# Patient Record
Sex: FEMALE | Race: WHITE | NOT HISPANIC OR LATINO | Employment: UNEMPLOYED | URBAN - NONMETROPOLITAN AREA
[De-identification: names, ages, dates, MRNs, and addresses within clinical notes are randomized per-mention and may not be internally consistent; named-entity substitution may affect disease eponyms.]

---

## 2019-09-06 ENCOUNTER — OFFICE VISIT (OUTPATIENT)
Dept: INTERNAL MEDICINE | Facility: CLINIC | Age: 39
End: 2019-09-06

## 2019-09-06 VITALS
BODY MASS INDEX: 37.61 KG/M2 | OXYGEN SATURATION: 98 % | HEART RATE: 82 BPM | DIASTOLIC BLOOD PRESSURE: 93 MMHG | WEIGHT: 234 LBS | RESPIRATION RATE: 18 BRPM | SYSTOLIC BLOOD PRESSURE: 127 MMHG | TEMPERATURE: 98 F | HEIGHT: 66 IN

## 2019-09-06 DIAGNOSIS — J20.9 ACUTE BRONCHITIS, UNSPECIFIED ORGANISM: Primary | ICD-10-CM

## 2019-09-06 DIAGNOSIS — J01.00 ACUTE NON-RECURRENT MAXILLARY SINUSITIS: ICD-10-CM

## 2019-09-06 PROCEDURE — 99213 OFFICE O/P EST LOW 20 MIN: CPT | Performed by: NURSE PRACTITIONER

## 2019-09-06 PROCEDURE — 96372 THER/PROPH/DIAG INJ SC/IM: CPT | Performed by: NURSE PRACTITIONER

## 2019-09-06 RX ORDER — METHYLPREDNISOLONE ACETATE 40 MG/ML
40 INJECTION, SUSPENSION INTRA-ARTICULAR; INTRALESIONAL; INTRAMUSCULAR; SOFT TISSUE ONCE
Status: COMPLETED | OUTPATIENT
Start: 2019-09-06 | End: 2019-09-06

## 2019-09-06 RX ORDER — ALBUTEROL SULFATE 2.5 MG/3ML
2.5 SOLUTION RESPIRATORY (INHALATION) EVERY 4 HOURS PRN
Qty: 100 VIAL | Refills: 0 | Status: SHIPPED | OUTPATIENT
Start: 2019-09-06 | End: 2021-06-24 | Stop reason: SDUPTHER

## 2019-09-06 RX ORDER — AZITHROMYCIN 250 MG/1
TABLET, FILM COATED ORAL
Qty: 6 TABLET | Refills: 0 | Status: SHIPPED | OUTPATIENT
Start: 2019-09-06 | End: 2020-10-06

## 2019-09-06 RX ADMIN — METHYLPREDNISOLONE ACETATE 40 MG: 40 INJECTION, SUSPENSION INTRA-ARTICULAR; INTRALESIONAL; INTRAMUSCULAR; SOFT TISSUE at 12:15

## 2019-09-06 NOTE — PROGRESS NOTES
CC: cough and myalgia    History:  Dustin Mosley is a 38 y.o. female who presents today for evaluation of the above problems.    URI    This is a new problem. The current episode started in the past 7 days. The problem has been gradually worsening. There has been no fever. Associated symptoms include congestion, coughing, ear pain, headaches, rhinorrhea and wheezing. Pertinent negatives include no sneezing or sore throat. Treatments tried: Dulera inhaler, nebulizer, OTC cough syrup. The treatment provided no relief.   Cough   This is a new problem. The current episode started in the past 7 days. The problem has been gradually worsening. The problem occurs every few minutes. The cough is non-productive. Associated symptoms include ear pain, headaches, nasal congestion, rhinorrhea, shortness of breath and wheezing. Pertinent negatives include no fever or sore throat. Nothing aggravates the symptoms. Treatments tried: Duler and albuterol nebulizer. The treatment provided no relief.     ROS:  Review of Systems   Constitutional: Negative for fever.   HENT: Positive for congestion, ear pain and rhinorrhea. Negative for sneezing and sore throat.    Respiratory: Positive for cough, shortness of breath and wheezing.    Neurological: Positive for headaches.       No Known Allergies  Past Medical History:   Diagnosis Date   • Anxiety    • Heart burn    • Hypertension      Past Surgical History:   Procedure Laterality Date   •  SECTION     • HERNIA REPAIR  2018     Family History   Problem Relation Age of Onset   • Cancer Mother    • Hypertension Mother       reports that she has been smoking cigarettes.  She started smoking about 19 years ago. She has been smoking about 1.00 pack per day. She has never used smokeless tobacco. She reports that she does not drink alcohol or use drugs.      Current Outpatient Medications:   •  albuterol (PROVENTIL) (2.5 MG/3ML) 0.083% nebulizer solution, Take 2.5 mg by nebulization Every 4  "(Four) Hours As Needed for Wheezing., Disp: 100 vial, Rfl: 0  •  azithromycin (ZITHROMAX) 250 MG tablet, Take 2 tablets the first day, then 1 tablet daily for 4 days., Disp: 6 tablet, Rfl: 0    Current Facility-Administered Medications:   •  methylPREDNISolone acetate (DEPO-medrol) injection 40 mg, 40 mg, Intramuscular, Once, Fernando Joyce M, APRN    OBJECTIVE:  /93 (BP Location: Right arm, Patient Position: Sitting, Cuff Size: Adult)   Pulse 82   Temp 98 °F (36.7 °C) (Oral)   Resp 18   Ht 167.6 cm (66\")   Wt 106 kg (234 lb)   LMP 09/02/2019 (Exact Date)   SpO2 98%   Breastfeeding? No   BMI 37.77 kg/m²    Physical Exam   Constitutional: She is oriented to person, place, and time. Vital signs are normal. She appears well-developed and well-nourished.   HENT:   Right Ear: Tympanic membrane, external ear and ear canal normal.   Left Ear: Tympanic membrane, external ear and ear canal normal.   Mouth/Throat: Oropharynx is clear and moist.   Cardiovascular: Normal rate and regular rhythm.   Pulmonary/Chest: Effort normal. She has wheezes.   Neurological: She is alert and oriented to person, place, and time.   Psychiatric: She has a normal mood and affect. Her behavior is normal.   Vitals reviewed.      Assessment/Plan    Dustin was seen today for uri and cough.    Diagnoses and all orders for this visit:    Acute bronchitis, unspecified organism  -     albuterol (PROVENTIL) (2.5 MG/3ML) 0.083% nebulizer solution; Take 2.5 mg by nebulization Every 4 (Four) Hours As Needed for Wheezing.  -     methylPREDNISolone acetate (DEPO-medrol) injection 40 mg  -     azithromycin (ZITHROMAX) 250 MG tablet; Take 2 tablets the first day, then 1 tablet daily for 4 days.    Acute non-recurrent maxillary sinusitis  -     methylPREDNISolone acetate (DEPO-medrol) injection 40 mg  -     azithromycin (ZITHROMAX) 250 MG tablet; Take 2 tablets the first day, then 1 tablet daily for 4 days.    Advised patient to avoid use of Dulera " as this was not prescribed to her.  She does have nebulizer machine already at home.      An After Visit Summary was printed and given to the patient at discharge.  Return if symptoms worsen or fail to improve.       BRYON Haro 09/06/2019    Electronically signed.

## 2020-01-23 ENCOUNTER — OFFICE VISIT (OUTPATIENT)
Dept: INTERNAL MEDICINE | Facility: CLINIC | Age: 40
End: 2020-01-23

## 2020-01-23 VITALS
HEART RATE: 91 BPM | OXYGEN SATURATION: 99 % | DIASTOLIC BLOOD PRESSURE: 77 MMHG | WEIGHT: 234 LBS | SYSTOLIC BLOOD PRESSURE: 114 MMHG | BODY MASS INDEX: 37.77 KG/M2 | TEMPERATURE: 98.2 F | RESPIRATION RATE: 16 BRPM

## 2020-01-23 DIAGNOSIS — R10.32 LEFT GROIN PAIN: Primary | ICD-10-CM

## 2020-01-23 PROCEDURE — 99213 OFFICE O/P EST LOW 20 MIN: CPT | Performed by: INTERNAL MEDICINE

## 2020-05-15 ENCOUNTER — OFFICE VISIT (OUTPATIENT)
Dept: INTERNAL MEDICINE | Facility: CLINIC | Age: 40
End: 2020-05-15

## 2020-05-15 VITALS
HEIGHT: 66 IN | WEIGHT: 223 LBS | HEART RATE: 92 BPM | BODY MASS INDEX: 35.84 KG/M2 | TEMPERATURE: 98.2 F | SYSTOLIC BLOOD PRESSURE: 122 MMHG | DIASTOLIC BLOOD PRESSURE: 82 MMHG | OXYGEN SATURATION: 99 %

## 2020-05-15 DIAGNOSIS — R10.32 LLQ PAIN: Primary | ICD-10-CM

## 2020-05-15 DIAGNOSIS — N30.00 ACUTE CYSTITIS WITHOUT HEMATURIA: ICD-10-CM

## 2020-05-15 LAB
BILIRUB BLD-MCNC: NEGATIVE MG/DL
CLARITY, POC: CLEAR
COLOR UR: YELLOW
GLUCOSE UR STRIP-MCNC: NEGATIVE MG/DL
KETONES UR QL: NEGATIVE
LEUKOCYTE EST, POC: ABNORMAL
NITRITE UR-MCNC: NEGATIVE MG/ML
PH UR: 7.5 [PH] (ref 5–8)
PROT UR STRIP-MCNC: NEGATIVE MG/DL
RBC # UR STRIP: ABNORMAL /UL
SP GR UR: 1.02 (ref 1–1.03)
UROBILINOGEN UR QL: NORMAL

## 2020-05-15 PROCEDURE — 99213 OFFICE O/P EST LOW 20 MIN: CPT | Performed by: NURSE PRACTITIONER

## 2020-05-15 PROCEDURE — 81003 URINALYSIS AUTO W/O SCOPE: CPT | Performed by: NURSE PRACTITIONER

## 2020-05-15 RX ORDER — METRONIDAZOLE 500 MG/1
500 TABLET ORAL 3 TIMES DAILY
Qty: 21 TABLET | Refills: 0 | Status: SHIPPED | OUTPATIENT
Start: 2020-05-15 | End: 2020-10-06

## 2020-05-15 RX ORDER — CIPROFLOXACIN 500 MG/1
500 TABLET, FILM COATED ORAL 2 TIMES DAILY
Qty: 14 TABLET | Refills: 0 | Status: SHIPPED | OUTPATIENT
Start: 2020-05-15 | End: 2020-10-06

## 2020-05-15 RX ORDER — ONDANSETRON 4 MG/1
4 TABLET, ORALLY DISINTEGRATING ORAL EVERY 8 HOURS PRN
Qty: 20 TABLET | Refills: 0 | Status: SHIPPED | OUTPATIENT
Start: 2020-05-15 | End: 2020-10-07 | Stop reason: SDUPTHER

## 2020-05-15 RX ORDER — DICYCLOMINE HYDROCHLORIDE 10 MG/1
10 CAPSULE ORAL 2 TIMES DAILY PRN
Qty: 20 CAPSULE | Refills: 1 | Status: SHIPPED | OUTPATIENT
Start: 2020-05-15 | End: 2020-10-14 | Stop reason: SDUPTHER

## 2020-05-15 NOTE — PROGRESS NOTES
"        Subjective     Chief Complaint   Patient presents with   • Abdominal Pain     radiates around to back       History of Present Illness  Pt presents today with complaints of LLQ pain. Onset was this week. States her LMP was 2 weeks ago. She is sexually active. No fevers but did have chills. States her urine stream is slowed. She does admit to \"problems with her intestines.\" No prior colonoscopy. She has chronic constipation. She had regular stool but associated with cramping yesterday. Felt like she did not empty her colon. Some nausea no vomiting.     Review of Systems   Otherwise complete ROS reviewed and negative except as mentioned in the HPI.    Past Medical History:   Past Medical History:   Diagnosis Date   • Anxiety    • Heart burn    • Hypertension      Past Surgical History:  Past Surgical History:   Procedure Laterality Date   •  SECTION     • HERNIA REPAIR       Social History:  reports that she has been smoking cigarettes. She started smoking about 20 years ago. She has been smoking about 1.00 pack per day. She has never used smokeless tobacco. She reports that she does not drink alcohol or use drugs.    Family History: family history includes Cancer in her mother; Hypertension in her mother.      Allergies:  No Known Allergies  Medications:  Prior to Admission medications    Medication Sig Start Date End Date Taking? Authorizing Provider   albuterol (PROVENTIL) (2.5 MG/3ML) 0.083% nebulizer solution Take 2.5 mg by nebulization Every 4 (Four) Hours As Needed for Wheezing. 19  Yes Joyce King APRN   azithromycin (ZITHROMAX) 250 MG tablet Take 2 tablets the first day, then 1 tablet daily for 4 days. 19   Joyce King APRN       Objective     Vital Signs: /82 (BP Location: Left arm, Patient Position: Sitting, Cuff Size: Adult)   Pulse 92   Temp 98.2 °F (36.8 °C) (Skin)   Ht 167.6 cm (66\")   Wt 101 kg (223 lb)   SpO2 99%   Breastfeeding No   BMI 35.99 kg/m² "   Physical Exam   Constitutional: She is oriented to person, place, and time. She appears well-developed and well-nourished.   HENT:   Head: Normocephalic and atraumatic.   Eyes: Pupils are equal, round, and reactive to light. EOM are normal.   Neck: Normal range of motion. Neck supple. No JVD present.   Cardiovascular: Normal rate and regular rhythm.   Pulmonary/Chest: Effort normal.   Abdominal: Soft. Bowel sounds are normal. There is tenderness ( diffuse tenderness even into the bilateral flanks. no guarding. more tender LLQ and suprapubic).   Musculoskeletal: She exhibits no edema or deformity.   Lymphadenopathy:     She has no cervical adenopathy.   Neurological: She is alert and oriented to person, place, and time.   Skin: Skin is warm and dry.   Psychiatric: She has a normal mood and affect. Her behavior is normal. Judgment and thought content normal.   Vitals reviewed.      Results Reviewed:  Glucose   Date Value Ref Range Status   10/17/2015 81 70 - 100 mg/dL Final     BUN   Date Value Ref Range Status   10/17/2015 11 5 - 21 mg/dL Final     Creatinine   Date Value Ref Range Status   10/17/2015 0.84 0.5 - 1.4 mg/dL Final     Sodium   Date Value Ref Range Status   10/17/2015 140 135 - 145 mmol/L Final     Potassium   Date Value Ref Range Status   10/17/2015 3.6 3.5 - 5.3 mmol/L Final     Chloride   Date Value Ref Range Status   10/17/2015 102 98 - 110 mmol/L Final     CO2   Date Value Ref Range Status   10/17/2015 27 24 - 31 mmol/L Final     Calcium   Date Value Ref Range Status   10/17/2015 9.7 8.4 - 10.4 mg/dL Final     ALT (SGPT)   Date Value Ref Range Status   10/17/2015 32 0 - 54 Units/L Final     AST (SGOT)   Date Value Ref Range Status   10/17/2015 45 7 - 45 Units/L Final     WBC   Date Value Ref Range Status   10/17/2015 9.59 4.80 - 10.80 K/mcL Final     Hematocrit   Date Value Ref Range Status   10/17/2015 32.8 (L) 37.0 - 47.0 % Final     Platelets   Date Value Ref Range Status   10/17/2015 477 (H)  130 - 400 K/[facility] Final         Assessment / Plan     Assessment/Plan:  Dustin was seen today for abdominal pain.    Diagnoses and all orders for this visit:    LLQ pain  -     XR Abdomen Flat & Upright (In Office)  -     POCT urinalysis dipstick, multipro  -     Urine Culture - Urine, Urine, Clean Catch    Acute cystitis without hematuria    Other orders  -     ciprofloxacin (Cipro) 500 MG tablet; Take 1 tablet by mouth 2 (Two) Times a Day.  -     metroNIDAZOLE (Flagyl) 500 MG tablet; Take 1 tablet by mouth 3 (Three) Times a Day.  -     ondansetron ODT (Zofran ODT) 4 MG disintegrating tablet; Place 1 tablet on the tongue Every 8 (Eight) Hours As Needed for Nausea or Vomiting.  -     dicyclomine (Bentyl) 10 MG capsule; Take 1 capsule by mouth 2 (Two) Times a Day As Needed (abdominal cramping).      Will treat for UTI and possible diverticulitis. Flat and upright was unremarkable. Some stool in the colon.   Instructed her to eat low fiber diet.   If she does not improve, will need CT of the abdomen.     Return in about 1 week (around 5/22/2020). unless patient needs to be seen sooner or acute issues arise.    Code Status: Full.     I have discussed the patient results/orders and and plan/recommendation with them at today's visit.      Luma Hickman, APRN   05/15/2020

## 2020-05-17 LAB
BACTERIA UR CULT: NORMAL
BACTERIA UR CULT: NORMAL

## 2020-05-20 ENCOUNTER — OFFICE VISIT (OUTPATIENT)
Dept: INTERNAL MEDICINE | Facility: CLINIC | Age: 40
End: 2020-05-20

## 2020-05-20 VITALS
TEMPERATURE: 98.3 F | HEIGHT: 66 IN | RESPIRATION RATE: 18 BRPM | HEART RATE: 111 BPM | OXYGEN SATURATION: 98 % | WEIGHT: 236.9 LBS | DIASTOLIC BLOOD PRESSURE: 84 MMHG | SYSTOLIC BLOOD PRESSURE: 134 MMHG | BODY MASS INDEX: 38.07 KG/M2

## 2020-05-20 DIAGNOSIS — R10.32 LLQ PAIN: Primary | ICD-10-CM

## 2020-05-20 DIAGNOSIS — N30.00 ACUTE CYSTITIS WITHOUT HEMATURIA: ICD-10-CM

## 2020-05-20 PROCEDURE — 99213 OFFICE O/P EST LOW 20 MIN: CPT | Performed by: NURSE PRACTITIONER

## 2020-05-20 NOTE — PROGRESS NOTES
Subjective     Chief Complaint   Patient presents with   • Follow-up       History of Present Illness  Pt presents today to follow up.  She was seen last week with bilateral flank pain and abdominal pain.  Was treated for presumptive diverticulitis and urinary tract infection.  Urine culture is not showing any growth.  Has had a slightly difficult time tolerating Cipro Flagyl together however has been able to persevere in 3 days.  No fevers or chills.  Feels about 85 to 90% better.  She is having a looser stools as she had large amount of stool in her x-ray.  Her pain has resolved.  She has had no bleeding.    Review of Systems   Otherwise complete ROS reviewed and negative except as mentioned in the HPI.    Past Medical History:   Past Medical History:   Diagnosis Date   • Anxiety    • Heart burn    • Hypertension      Past Surgical History:  Past Surgical History:   Procedure Laterality Date   •  SECTION     • HERNIA REPAIR       Social History:  reports that she has been smoking cigarettes. She started smoking about 20 years ago. She has been smoking about 1.00 pack per day. She has never used smokeless tobacco. She reports that she does not drink alcohol or use drugs.    Family History: family history includes Cancer in her mother; Hypertension in her mother.     Allergies:  No Known Allergies  Medications:  Prior to Admission medications    Medication Sig Start Date End Date Taking? Authorizing Provider   ciprofloxacin (Cipro) 500 MG tablet Take 1 tablet by mouth 2 (Two) Times a Day. 5/15/20  Yes Luma Hickman APRN   dicyclomine (Bentyl) 10 MG capsule Take 1 capsule by mouth 2 (Two) Times a Day As Needed (abdominal cramping). 5/15/20  Yes Luma Hickman APRN   Etonogestrel (Nexplanon) 68 MG implant subdermal implant Nexplanon 68 mg subdermal implant   Inject 1 implant by subcutaneous route.   Yes Provider, MD Melia   metroNIDAZOLE (Flagyl) 500 MG tablet Take 1  "tablet by mouth 3 (Three) Times a Day. 5/15/20  Yes Luma Hickman APRN   albuterol (PROVENTIL) (2.5 MG/3ML) 0.083% nebulizer solution Take 2.5 mg by nebulization Every 4 (Four) Hours As Needed for Wheezing. 9/6/19   Joyce King APRN   azithromycin (ZITHROMAX) 250 MG tablet Take 2 tablets the first day, then 1 tablet daily for 4 days. 9/6/19   Joyce King APRN   ondansetron ODT (Zofran ODT) 4 MG disintegrating tablet Place 1 tablet on the tongue Every 8 (Eight) Hours As Needed for Nausea or Vomiting. 5/15/20   Luma Hickman APRN       Objective     Vital Signs: /84 (BP Location: Right arm, Patient Position: Sitting, Cuff Size: Large Adult)   Pulse 111   Temp 98.3 °F (36.8 °C) (Skin)   Resp 18   Ht 167.6 cm (66\")   Wt 107 kg (236 lb 14.4 oz)   SpO2 98%   BMI 38.24 kg/m²   Physical Exam   Constitutional: She is oriented to person, place, and time. She appears well-developed and well-nourished.   HENT:   Head: Normocephalic and atraumatic.   Eyes: Pupils are equal, round, and reactive to light. EOM are normal.   Neck: Normal range of motion. Neck supple. No JVD present.   Cardiovascular: Normal rate and regular rhythm.   Pulmonary/Chest: Effort normal and breath sounds normal.   Abdominal: Soft. Bowel sounds are normal.   No tenderness today.    Musculoskeletal: She exhibits no edema or deformity.   Lymphadenopathy:     She has no cervical adenopathy.   Neurological: She is alert and oriented to person, place, and time.   Skin: Skin is warm and dry.   Psychiatric: She has a normal mood and affect. Her behavior is normal. Judgment and thought content normal.   Vitals reviewed.      Results Reviewed:  Glucose   Date Value Ref Range Status   10/17/2015 81 70 - 100 mg/dL Final     BUN   Date Value Ref Range Status   10/17/2015 11 5 - 21 mg/dL Final     Creatinine   Date Value Ref Range Status   10/17/2015 0.84 0.5 - 1.4 mg/dL Final     Sodium   Date Value Ref Range Status "   10/17/2015 140 135 - 145 mmol/L Final     Potassium   Date Value Ref Range Status   10/17/2015 3.6 3.5 - 5.3 mmol/L Final     Chloride   Date Value Ref Range Status   10/17/2015 102 98 - 110 mmol/L Final     CO2   Date Value Ref Range Status   10/17/2015 27 24 - 31 mmol/L Final     Calcium   Date Value Ref Range Status   10/17/2015 9.7 8.4 - 10.4 mg/dL Final     ALT (SGPT)   Date Value Ref Range Status   10/17/2015 32 0 - 54 Units/L Final     AST (SGOT)   Date Value Ref Range Status   10/17/2015 45 7 - 45 Units/L Final     WBC   Date Value Ref Range Status   10/17/2015 9.59 4.80 - 10.80 K/mcL Final     Hematocrit   Date Value Ref Range Status   10/17/2015 32.8 (L) 37.0 - 47.0 % Final     Platelets   Date Value Ref Range Status   10/17/2015 477 (H) 130 - 400 K/mcL Final         Assessment / Plan     Assessment/Plan:  Dustin was seen today for follow-up.    Diagnoses and all orders for this visit:    LLQ pain    Acute cystitis without hematuria    Her symptoms are resolving on Cipro and Flagyl.  DrColette to finish this course of antibiotics.  She is can return at some point for her annual physical and laboratory data.      No follow-ups on file. unless patient needs to be seen sooner or acute issues arise.    Code Status Full    I have discussed the patient results/orders and and plan/recommendation with them at today's visit.      Luma Hickman, APRN   05/20/2020

## 2020-10-06 ENCOUNTER — TELEMEDICINE (OUTPATIENT)
Dept: INTERNAL MEDICINE | Facility: CLINIC | Age: 40
End: 2020-10-06

## 2020-10-06 DIAGNOSIS — J40 BRONCHITIS: Primary | ICD-10-CM

## 2020-10-06 PROCEDURE — 99213 OFFICE O/P EST LOW 20 MIN: CPT | Performed by: NURSE PRACTITIONER

## 2020-10-06 RX ORDER — AMOXICILLIN AND CLAVULANATE POTASSIUM 875; 125 MG/1; MG/1
1 TABLET, FILM COATED ORAL 2 TIMES DAILY
Qty: 20 TABLET | Refills: 0 | Status: SHIPPED | OUTPATIENT
Start: 2020-10-06 | End: 2020-10-16

## 2020-10-06 RX ORDER — PREDNISONE 10 MG/1
TABLET ORAL
Qty: 21 TABLET | Refills: 0 | Status: SHIPPED | OUTPATIENT
Start: 2020-10-06 | End: 2021-03-29 | Stop reason: SDUPTHER

## 2020-10-06 NOTE — PROGRESS NOTES
CC: cough, wheezing    History:  Dustin Mosley is a 40 y.o. female who presents today for evaluation of the above problems.      Been sick for about a week.  No fever, but chills.   Complains of congestion and a sore throat that is improving.  Cough was initially productive, but is now non productive.  She is experiencing shortness of breath and wheezing.  She also did initially have diarrhea and vomiting, however, these have resolved.        HPI  ROS:  Review of Systems   Constitutional: Positive for chills. Negative for fever.   HENT: Positive for congestion and sore throat (getting better).    Respiratory: Positive for cough (non productive), shortness of breath and wheezing.    Gastrointestinal: Positive for diarrhea and vomiting.   Neurological: Positive for dizziness (with coughing).       No Known Allergies  Past Medical History:   Diagnosis Date   • Anxiety    • Heart burn    • Hypertension      Past Surgical History:   Procedure Laterality Date   •  SECTION     • HERNIA REPAIR  2018     Family History   Problem Relation Age of Onset   • Cancer Mother    • Hypertension Mother       reports that she has been smoking cigarettes. She started smoking about 20 years ago. She has been smoking about 1.00 pack per day. She has never used smokeless tobacco. She reports that she does not drink alcohol or use drugs.      Current Outpatient Medications:   •  albuterol (PROVENTIL) (2.5 MG/3ML) 0.083% nebulizer solution, Take 2.5 mg by nebulization Every 4 (Four) Hours As Needed for Wheezing., Disp: 100 vial, Rfl: 0  •  amoxicillin-clavulanate (Augmentin) 875-125 MG per tablet, Take 1 tablet by mouth 2 (Two) Times a Day for 10 days., Disp: 20 tablet, Rfl: 0  •  dicyclomine (Bentyl) 10 MG capsule, Take 1 capsule by mouth 2 (Two) Times a Day As Needed (abdominal cramping)., Disp: 20 capsule, Rfl: 1  •  Etonogestrel (Nexplanon) 68 MG implant subdermal implant, Nexplanon 68 mg subdermal implant  Inject 1 implant by  subcutaneous route., Disp: , Rfl:   •  ondansetron ODT (Zofran ODT) 4 MG disintegrating tablet, Place 1 tablet on the tongue Every 8 (Eight) Hours As Needed for Nausea or Vomiting., Disp: 20 tablet, Rfl: 0  •  predniSONE (DELTASONE) 10 MG (21) dose pack, Use as directed on package, Disp: 21 tablet, Rfl: 0    OBJECTIVE:  There were no vitals taken for this visit.   Physical Exam  Constitutional:       Appearance: She is well-developed. She is ill-appearing.   Pulmonary:      Effort: Pulmonary effort is normal.   Neurological:      Mental Status: She is alert and oriented to person, place, and time.   Psychiatric:         Behavior: Behavior normal.         Assessment/Plan    Diagnoses and all orders for this visit:    Bronchitis  -     amoxicillin-clavulanate (Augmentin) 875-125 MG per tablet; Take 1 tablet by mouth 2 (Two) Times a Day for 10 days.  -     predniSONE (DELTASONE) 10 MG (21) dose pack; Use as directed on package  -     COVID-19,[facility],NP/OP Swab in Transport Media or ESwab 72 HR TAT - Swab, Oropharynx; Future    Advised to use albuterol nebulizer three times per day.  Quarantine until COVID results.      This visit was completed via secure Zoom connection.     An After Visit Summary was printed and given to the patient at discharge.  Return if symptoms worsen or fail to improve, for Next scheduled follow up.       BRYON Haro 10/6/2020    Electronically signed.

## 2020-10-07 LAB — SARS-COV-2 RNA RESP QL NAA+PROBE: NOT DETECTED

## 2020-10-07 RX ORDER — ONDANSETRON 4 MG/1
4 TABLET, ORALLY DISINTEGRATING ORAL EVERY 8 HOURS PRN
Qty: 20 TABLET | Refills: 0 | Status: SHIPPED | OUTPATIENT
Start: 2020-10-07 | End: 2021-02-06 | Stop reason: SDUPTHER

## 2020-10-08 ENCOUNTER — TELEPHONE (OUTPATIENT)
Dept: INTERNAL MEDICINE | Facility: CLINIC | Age: 40
End: 2020-10-08

## 2020-10-08 NOTE — TELEPHONE ENCOUNTER
COVID-19 Test Result   Telephone Encounter    Patient Name: Dustin Mosley   : 1980   MRN: [id number]     SARS-CoV-2, EBER   Date Value Ref Range Status   10/06/2020 Not Detected Not Detected Final     Comment:     This nucleic acid amplification test was developed and its performance  characteristics determined by [employer]. Nucleic acid  amplification tests include PCR and TMA. This test has not been FDA  cleared or approved. This test has been authorized by FDA under an  Emergency Use Authorization (EUA). This test is only authorized for  the duration of time the declaration that circumstances exist  justifying the authorization of the emergency use of in vitro  diagnostic tests for detection of SARS-CoV-2 virus and/or diagnosis  of COVID-19 infection under section 564(b)(1) of the Act, 21 U.S.C.  360bbb-3(b) (1), unless the authorization is terminated or revoked  sooner.  When diagnostic testing is negative, the possibility of a false  negative result should be considered in the context of a patient's  recent exposures and the presence of clinical signs and symptoms  consistent with COVID-19. An individual without symptoms of COVID-19  and who is not shedding SARS-CoV-2 virus would expect to have a  negative (not detected) result in this assay.          Patient was counseled as follows:  • (-) negative COVID-19 test result with or without symptoms   • The test is not perfect, so there is a chance it could be falsely negative or the virus level is too low for detection due to being very early in the infectious process.   • The optimal duration of home isolation is uncertain. The [place] Centers for Disease Control and Prevention (CDC) has issued recommendations on discontinuation of home isolation.   • For this reason, Dustin is strongly encouraged to practice the safest standards in protecting their health and others given the current pandemic concerns. She is advised to:   o Practice social  distancing in the community by staying at least 6 feet away from people   o Encouraged to use face mask while out in public   o Continue to wash their hands frequently with soap and hot water, and cover their mouth while coughing.   • If Dustin is asymptomatic, she should self isolate for a total of 14 days from time of potential contact with Covid-19.   • If Dustin is symptomatic then she may discontinue home isolation when the following criteria are met:   o At least seven days have passed since symptoms first appeared AND   o At least three days (72 hours) have passed since recovery of symptoms (defined as resolution of fever without the use of fever-reducing medications and improvement in respiratory symptoms [e.g., cough, shortness of breath])   • If Dustin has been asymptomatic but then develops non-emergent symptoms such as mild increased shortness of breath, fever, cough, or for other questions, she  was asked to please call their primary care physician’s office or the [place] [facility] at [phone].   · Questions were engaged and answered to the best of my ability. She         expressed verbal understanding of their test results and my advice.    Primary Care Physician verified as being: Luma Hickman APRN      Electronically signed by Osman Britt RN, 10/08/20, 8:23 AM CDT.

## 2020-10-08 NOTE — TELEPHONE ENCOUNTER
----- Message from BRYON England sent at 10/8/2020  8:07 AM CDT -----  Please advise that COVID is negative.

## 2020-10-14 RX ORDER — DICYCLOMINE HYDROCHLORIDE 10 MG/1
10 CAPSULE ORAL 2 TIMES DAILY PRN
Qty: 20 CAPSULE | Refills: 1 | Status: SHIPPED | OUTPATIENT
Start: 2020-10-14

## 2021-02-08 RX ORDER — ONDANSETRON 4 MG/1
4 TABLET, ORALLY DISINTEGRATING ORAL EVERY 8 HOURS PRN
Qty: 20 TABLET | Refills: 0 | Status: SHIPPED | OUTPATIENT
Start: 2021-02-08 | End: 2021-03-26 | Stop reason: SDUPTHER

## 2021-03-26 ENCOUNTER — OFFICE VISIT (OUTPATIENT)
Dept: INTERNAL MEDICINE | Facility: CLINIC | Age: 41
End: 2021-03-26

## 2021-03-26 VITALS
SYSTOLIC BLOOD PRESSURE: 130 MMHG | BODY MASS INDEX: 38.09 KG/M2 | DIASTOLIC BLOOD PRESSURE: 90 MMHG | TEMPERATURE: 98 F | HEART RATE: 90 BPM | HEIGHT: 66 IN | OXYGEN SATURATION: 98 % | WEIGHT: 237 LBS

## 2021-03-26 DIAGNOSIS — R05.9 COUGH: Primary | ICD-10-CM

## 2021-03-26 DIAGNOSIS — J02.9 PHARYNGITIS, UNSPECIFIED ETIOLOGY: ICD-10-CM

## 2021-03-26 LAB
EXPIRATION DATE: NORMAL
EXPIRATION DATE: NORMAL
FLUAV AG NPH QL: NEGATIVE
FLUBV AG NPH QL: NEGATIVE
INTERNAL CONTROL: NORMAL
INTERNAL CONTROL: NORMAL
Lab: NORMAL
Lab: NORMAL
S PYO AG THROAT QL: NEGATIVE

## 2021-03-26 PROCEDURE — 87880 STREP A ASSAY W/OPTIC: CPT | Performed by: NURSE PRACTITIONER

## 2021-03-26 PROCEDURE — 99214 OFFICE O/P EST MOD 30 MIN: CPT | Performed by: NURSE PRACTITIONER

## 2021-03-26 PROCEDURE — 87804 INFLUENZA ASSAY W/OPTIC: CPT | Performed by: NURSE PRACTITIONER

## 2021-03-26 RX ORDER — AZITHROMYCIN 250 MG/1
TABLET, FILM COATED ORAL
Qty: 6 TABLET | Refills: 0 | Status: SHIPPED | OUTPATIENT
Start: 2021-03-26 | End: 2021-08-25

## 2021-03-26 RX ORDER — ALBUTEROL SULFATE 90 UG/1
2 AEROSOL, METERED RESPIRATORY (INHALATION) EVERY 4 HOURS PRN
Qty: 8 G | Refills: 1 | Status: SHIPPED | OUTPATIENT
Start: 2021-03-26 | End: 2022-03-08 | Stop reason: SDUPTHER

## 2021-03-26 RX ORDER — ONDANSETRON 4 MG/1
4 TABLET, ORALLY DISINTEGRATING ORAL EVERY 8 HOURS PRN
Qty: 20 TABLET | Refills: 0 | Status: SHIPPED | OUTPATIENT
Start: 2021-03-26

## 2021-03-26 RX ORDER — BENZONATATE 100 MG/1
100 CAPSULE ORAL 3 TIMES DAILY PRN
Qty: 21 CAPSULE | Refills: 0 | Status: SHIPPED | OUTPATIENT
Start: 2021-03-26

## 2021-03-26 NOTE — PROGRESS NOTES
Subjective     Chief Complaint   Patient presents with   • Cough   • Nasal Congestion       History of Present Illness  Patient comes in today with complaints of nasal congestion, cough, myalgias, and left ear pain.  Onset was Monday.  States that she feels like she has the flu.  Her son was flu positive and her  has been sick but was flu negative.  Her  was Covid negative.  No fevers.  States she is felt some chills but has not had any notable fevers even with checking it.  No loss of taste or smell.  She took some Mucinex last night it did help bring up some sputum. She does admit to some shortness of breath.     Review of Systems   Otherwise complete ROS reviewed and negative except as mentioned in the HPI.    Past Medical History:   Past Medical History:   Diagnosis Date   • Anxiety    • Heart burn    • Hypertension      Past Surgical History:  Past Surgical History:   Procedure Laterality Date   •  SECTION     • HERNIA REPAIR       Social History:  reports that she has been smoking cigarettes. She started smoking about 21 years ago. She has been smoking about 1.00 pack per day. She has never used smokeless tobacco. She reports that she does not drink alcohol and does not use drugs.    Family History: family history includes Cancer in her mother; Hypertension in her mother.      Allergies:  No Known Allergies  Medications:  Prior to Admission medications    Medication Sig Start Date End Date Taking? Authorizing Provider   albuterol (PROVENTIL) (2.5 MG/3ML) 0.083% nebulizer solution Take 2.5 mg by nebulization Every 4 (Four) Hours As Needed for Wheezing. 19   Joyce King APRN   dicyclomine (Bentyl) 10 MG capsule Take 1 capsule by mouth 2 (Two) Times a Day As Needed (abdominal cramping). 10/14/20   Luma Hickman APRN   Etonogestrel (Nexplanon) 68 MG implant subdermal implant Nexplanon 68 mg subdermal implant   Inject 1 implant by subcutaneous route.     "Provider, MD Melia   ondansetron ODT (Zofran ODT) 4 MG disintegrating tablet Place 1 tablet on the tongue Every 8 (Eight) Hours As Needed for Nausea or Vomiting. 2/8/21   Luma Hickman APRN   predniSONE (DELTASONE) 10 MG (21) dose pack Use as directed on package 10/6/20   Joyce King APRN       Objective     Vital Signs: /90   Pulse 90   Temp 98 °F (36.7 °C)   Ht 167.6 cm (66\")   Wt 108 kg (237 lb)   SpO2 98%   BMI 38.25 kg/m²   Physical Exam  Vitals reviewed.   Constitutional:       Appearance: She is well-developed.   HENT:      Head: Normocephalic and atraumatic.      Mouth/Throat:      Pharynx: Posterior oropharyngeal erythema present.   Eyes:      Pupils: Pupils are equal, round, and reactive to light.   Neck:      Vascular: No JVD.   Cardiovascular:      Rate and Rhythm: Regular rhythm. Tachycardia present.   Pulmonary:      Breath sounds: Normal breath sounds.      Comments: Coarse in upper airway  Abdominal:      General: Bowel sounds are normal.      Palpations: Abdomen is soft.   Musculoskeletal:         General: No deformity.      Cervical back: Normal range of motion and neck supple.   Lymphadenopathy:      Cervical: No cervical adenopathy.   Skin:     General: Skin is warm and dry.   Neurological:      Mental Status: She is alert and oriented to person, place, and time.   Psychiatric:         Behavior: Behavior normal.         Thought Content: Thought content normal.         Judgment: Judgment normal.       Results Reviewed:  Glucose   Date Value Ref Range Status   10/17/2015 81 70 - 100 mg/dL Final     BUN   Date Value Ref Range Status   10/17/2015 11 5 - 21 mg/dL Final     Creatinine   Date Value Ref Range Status   10/17/2015 0.84 0.5 - 1.4 mg/dL Final     Sodium   Date Value Ref Range Status   10/17/2015 140 135 - 145 mmol/L Final     Potassium   Date Value Ref Range Status   10/17/2015 3.6 3.5 - 5.3 mmol/L Final     Chloride   Date Value Ref Range Status "   10/17/2015 102 98 - 110 mmol/L Final     CO2   Date Value Ref Range Status   10/17/2015 27 24 - 31 mmol/L Final     Calcium   Date Value Ref Range Status   10/17/2015 9.7 8.4 - 10.4 mg/dL Final     ALT (SGPT)   Date Value Ref Range Status   10/17/2015 32 0 - 54 Units/L Final     AST (SGOT)   Date Value Ref Range Status   10/17/2015 45 7 - 45 Units/L Final     WBC   Date Value Ref Range Status   10/17/2015 9.59 4.80 - 10.80 K/mcL Final     Hematocrit   Date Value Ref Range Status   10/17/2015 32.8 (L) 37.0 - 47.0 % Final     Platelets   Date Value Ref Range Status   10/17/2015 477 (H) 130 - 400 K/mcL Final         Assessment / Plan     Assessment/Plan:  Diagnoses and all orders for this visit:    1. Cough (Primary)  -     COVID-19,[facility] ROUTINE, NP/OP SWAB IN TRANSPORT MEDIA OR ESWAB 72 HR TAT - Swab, Oropharynx  -     azithromycin (Zithromax Z-Gavin) 250 MG tablet; Take 2 tablets the first day, then 1 tablet daily for 4 days.  Dispense: 6 tablet; Refill: 0  -     benzonatate (Tessalon Perles) 100 MG capsule; Take 1 capsule by mouth 3 (Three) Times a Day As Needed for Cough.  Dispense: 21 capsule; Refill: 0  -     ondansetron ODT (Zofran ODT) 4 MG disintegrating tablet; Place 1 tablet on the tongue Every 8 (Eight) Hours As Needed for Nausea or Vomiting.  Dispense: 20 tablet; Refill: 0  -     albuterol sulfate  (90 Base) MCG/ACT inhaler; Inhale 2 puffs Every 4 (Four) Hours As Needed for Wheezing.  Dispense: 8 g; Refill: 1  -     POCT Influenza A/B  -     POCT rapid strep A    2. Pharyngitis, unspecified etiology  -     azithromycin (Zithromax Z-Gavin) 250 MG tablet; Take 2 tablets the first day, then 1 tablet daily for 4 days.  Dispense: 6 tablet; Refill: 0      No follow-ups on file. unless patient needs to be seen sooner or acute issues arise.    Code Status: Full.     I have discussed the patient results/orders and and plan/recommendation with them at today's visit.      Luma Hickman, APRN    03/26/2021

## 2021-03-27 LAB
LABCORP SARS-COV-2, NAA 2 DAY TAT: NORMAL
SARS-COV-2 RNA RESP QL NAA+PROBE: NOT DETECTED

## 2021-03-29 DIAGNOSIS — J40 BRONCHITIS: ICD-10-CM

## 2021-03-29 RX ORDER — PREDNISONE 10 MG/1
TABLET ORAL
Qty: 21 TABLET | Refills: 0 | Status: SHIPPED | OUTPATIENT
Start: 2021-03-29 | End: 2021-08-25

## 2021-03-29 RX ORDER — CEFDINIR 300 MG/1
300 CAPSULE ORAL 2 TIMES DAILY
Qty: 10 CAPSULE | Refills: 0 | Status: SHIPPED | OUTPATIENT
Start: 2021-03-29 | End: 2021-08-25

## 2021-06-21 DIAGNOSIS — J40 BRONCHITIS: ICD-10-CM

## 2021-06-22 RX ORDER — PREDNISONE 10 MG/1
TABLET ORAL
Qty: 21 TABLET | Refills: 0 | OUTPATIENT
Start: 2021-06-22

## 2021-06-24 DIAGNOSIS — J20.9 ACUTE BRONCHITIS, UNSPECIFIED ORGANISM: ICD-10-CM

## 2021-06-24 RX ORDER — ALBUTEROL SULFATE 2.5 MG/3ML
2.5 SOLUTION RESPIRATORY (INHALATION) EVERY 4 HOURS PRN
Qty: 100 EACH | Refills: 1 | Status: SHIPPED | OUTPATIENT
Start: 2021-06-24

## 2021-08-25 ENCOUNTER — OFFICE VISIT (OUTPATIENT)
Dept: INTERNAL MEDICINE | Facility: CLINIC | Age: 41
End: 2021-08-25

## 2021-08-25 ENCOUNTER — TELEPHONE (OUTPATIENT)
Dept: INTERNAL MEDICINE | Facility: CLINIC | Age: 41
End: 2021-08-25

## 2021-08-25 VITALS — RESPIRATION RATE: 20 BRPM | TEMPERATURE: 97.4 F | HEART RATE: 80 BPM | OXYGEN SATURATION: 94 %

## 2021-08-25 DIAGNOSIS — R06.2 WHEEZING: Primary | ICD-10-CM

## 2021-08-25 DIAGNOSIS — R05.9 COUGH: ICD-10-CM

## 2021-08-25 DIAGNOSIS — R06.02 SHORTNESS OF BREATH: ICD-10-CM

## 2021-08-25 DIAGNOSIS — Z86.16 HISTORY OF COVID-19: ICD-10-CM

## 2021-08-25 PROCEDURE — 99214 OFFICE O/P EST MOD 30 MIN: CPT | Performed by: FAMILY MEDICINE

## 2021-08-25 PROCEDURE — U0004 COV-19 TEST NON-CDC HGH THRU: HCPCS | Performed by: FAMILY MEDICINE

## 2021-08-25 RX ORDER — METHYLPREDNISOLONE 4 MG/1
TABLET ORAL
Qty: 1 EACH | Refills: 0 | Status: SHIPPED | OUTPATIENT
Start: 2021-08-25 | End: 2022-06-07 | Stop reason: SDUPTHER

## 2021-08-25 RX ORDER — DOXYCYCLINE 100 MG/1
100 CAPSULE ORAL 2 TIMES DAILY
Qty: 20 CAPSULE | Refills: 0 | Status: SHIPPED | OUTPATIENT
Start: 2021-08-25 | End: 2022-08-11

## 2021-08-25 NOTE — PROGRESS NOTES
Subjective     Chief Complaint   Patient presents with   • Nasal Congestion     Symptoms began 3 days ago.    • Cough   • Loss of taste and smell       History of Present Illness  Patient presents with complaints of cough with nasal congestion.  She has had Covid previously approximately 2 months ago.  She has no sense of taste or smell currently. She received antibodies for this.   She feels short of breath.  Her sputum is clear foamy.  Currently no nausea vomiting diarrhea.  Is a smoker.    Patient's PMR from outside medical facility reviewed and noted.    Review of Systems     Otherwise complete ROS reviewed and negative except as mentioned in the HPI.    Past Medical History:   Past Medical History:   Diagnosis Date   • Anxiety    • Heart burn    • Hypertension      Past Surgical History:  Past Surgical History:   Procedure Laterality Date   •  SECTION     • HERNIA REPAIR       Social History:  reports that she has been smoking cigarettes. She started smoking about 21 years ago. She has been smoking about 1.00 pack per day. She has never used smokeless tobacco. She reports that she does not drink alcohol and does not use drugs.    Family History: family history includes Cancer in her mother; Hypertension in her mother.       Allergies:  No Known Allergies  Medications:  Prior to Admission medications    Medication Sig Start Date End Date Taking? Authorizing Provider   albuterol (PROVENTIL) (2.5 MG/3ML) 0.083% nebulizer solution Take 2.5 mg by nebulization Every 4 (Four) Hours As Needed for Wheezing. 21   Luma Hickman APRN   albuterol sulfate  (90 Base) MCG/ACT inhaler Inhale 2 puffs Every 4 (Four) Hours As Needed for Wheezing. 3/26/21   Luma Hickman APRN   benzonatate (Tessalon Perles) 100 MG capsule Take 1 capsule by mouth 3 (Three) Times a Day As Needed for Cough. 3/26/21   Luma Hickman APRN   dicyclomine (Bentyl) 10 MG capsule Take 1 capsule  by mouth 2 (Two) Times a Day As Needed (abdominal cramping). 10/14/20   Luma Hickman APRN   doxycycline (MONODOX) 100 MG capsule Take 1 capsule by mouth 2 (Two) Times a Day. 8/25/21   Salina Barba DO   Etonogestrel (Nexplanon) 68 MG implant subdermal implant Nexplanon 68 mg subdermal implant   Inject 1 implant by subcutaneous route.    Provider, MD Melia   methylPREDNISolone (MEDROL) 4 MG dose pack Take as directed on package instructions. 8/25/21   Salina Barba DO   ondansetron ODT (Zofran ODT) 4 MG disintegrating tablet Place 1 tablet on the tongue Every 8 (Eight) Hours As Needed for Nausea or Vomiting. 3/26/21   Luma Hickman APRN       Objective     Vital Signs: Pulse 80   Temp 97.4 °F (36.3 °C)   Resp 20   SpO2 94%   Physical Exam  Vitals and nursing note reviewed.   Constitutional:       Appearance: Normal appearance. She is well-developed.   HENT:      Head: Normocephalic and atraumatic.      Right Ear: Tympanic membrane, ear canal and external ear normal.      Left Ear: Tympanic membrane, ear canal and external ear normal.      Nose: Nose normal.   Eyes:      General: No scleral icterus.        Right eye: No discharge.         Left eye: No discharge.      Conjunctiva/sclera: Conjunctivae normal.      Pupils: Pupils are equal, round, and reactive to light.   Neck:      Thyroid: No thyromegaly.      Vascular: No JVD.      Trachea: No tracheal deviation.   Cardiovascular:      Rate and Rhythm: Normal rate and regular rhythm.      Heart sounds: Normal heart sounds. No murmur heard.   No friction rub. No gallop.    Pulmonary:      Effort: Pulmonary effort is normal.      Breath sounds: Wheezing present.   Abdominal:      General: Bowel sounds are normal. There is no distension.      Palpations: Abdomen is soft.      Tenderness: There is no abdominal tenderness.   Musculoskeletal:         General: Normal range of motion.      Cervical back: Normal range of motion and  neck supple.   Lymphadenopathy:      Cervical: No cervical adenopathy.   Skin:     General: Skin is warm and dry.      Capillary Refill: Capillary refill takes less than 2 seconds.   Neurological:      Mental Status: She is alert and oriented to person, place, and time.      Cranial Nerves: No cranial nerve deficit.      Coordination: Coordination normal.   Psychiatric:         Mood and Affect: Mood normal.         Behavior: Behavior normal.           Results Reviewed:  Glucose   Date Value Ref Range Status   10/17/2015 81 70 - 100 mg/dL Final     BUN   Date Value Ref Range Status   10/17/2015 11 5 - 21 mg/dL Final     Creatinine   Date Value Ref Range Status   10/17/2015 0.84 0.5 - 1.4 mg/dL Final     Sodium   Date Value Ref Range Status   10/17/2015 140 135 - 145 mmol/L Final     Potassium   Date Value Ref Range Status   10/17/2015 3.6 3.5 - 5.3 mmol/L Final     Chloride   Date Value Ref Range Status   10/17/2015 102 98 - 110 mmol/L Final     CO2   Date Value Ref Range Status   10/17/2015 27 24 - 31 mmol/L Final     Calcium   Date Value Ref Range Status   10/17/2015 9.7 8.4 - 10.4 mg/dL Final     ALT (SGPT)   Date Value Ref Range Status   10/17/2015 32 0 - 54 Units/L Final     AST (SGOT)   Date Value Ref Range Status   10/17/2015 45 7 - 45 Units/L Final     WBC   Date Value Ref Range Status   10/17/2015 9.59 4.80 - 10.80 K/mcL Final     Hematocrit   Date Value Ref Range Status   10/17/2015 32.8 (L) 37.0 - 47.0 % Final     Platelets   Date Value Ref Range Status   10/17/2015 477 (H) 130 - 400 K/mcL Final         Assessment / Plan     Assessment/Plan:  1. Wheezing  - XR Chest PA & Lateral (In Office)  - COVID-19,[facility],PAD IN-HOUSE,NP/OP/NASAL SWAB IN UTM/VTM/SALINE/LIQUID AMIES TRANSPORT MEDIA/NP WASH OR ASPIRATE, 24 HR TAT - Swab, Nasal Cavity    2. Shortness of breath      3. Cough      4. History of COVID-19    Recommend the patient use her inhaler routinely while she is having shortness of breath and  wheezing.  Also recommend doxycycline 100 mg twice daily 10 days as well as a Medrol Dosepak.  If she does not improve she needs to be reevaluated.      Return if symptoms worsen or fail to improve. unless patient needs to be seen sooner or acute issues arise.        I have discussed the patient results/orders and and plan/recommendation with them at today's visit.      Salina Barba,    08/25/2021

## 2021-08-25 NOTE — TELEPHONE ENCOUNTER
Caller: Dustin Mosley    Relationship to patient: Self    Best call back number: [phone]     JUST WANTED TO LET OFFICE KNOW THAT I HAVE SCHEDULED PATIENT FOR A SAME DAY TODAY 08/25/2021 AT 11:15 WITH DR. MCMAHON SINCE [facility] DID NOT HAVE ANYTHING AVAILABLE.

## 2021-08-26 ENCOUNTER — TELEPHONE (OUTPATIENT)
Dept: INTERNAL MEDICINE | Facility: CLINIC | Age: 41
End: 2021-08-26

## 2021-08-26 LAB — SARS-COV-2 ORF1AB RESP QL NAA+PROBE: NOT DETECTED

## 2021-08-26 NOTE — TELEPHONE ENCOUNTER
Caller: Dustin Mosley    Relationship to patient: Self    Best call back number: [phone]    Patient is requesting COVID test results from 8/25. Please advise.

## 2021-08-26 NOTE — TELEPHONE ENCOUNTER
Called patient to inform her that lab results aren't back yet. She voiced understanding. I advised that we would call her as soon as we had results.

## 2022-03-08 ENCOUNTER — OFFICE VISIT (OUTPATIENT)
Dept: INTERNAL MEDICINE | Facility: CLINIC | Age: 42
End: 2022-03-08

## 2022-03-08 VITALS
WEIGHT: 240 LBS | BODY MASS INDEX: 38.57 KG/M2 | OXYGEN SATURATION: 98 % | HEART RATE: 100 BPM | DIASTOLIC BLOOD PRESSURE: 90 MMHG | HEIGHT: 66 IN | SYSTOLIC BLOOD PRESSURE: 140 MMHG | TEMPERATURE: 97 F | RESPIRATION RATE: 20 BRPM

## 2022-03-08 DIAGNOSIS — R06.09 DYSPNEA ON EXERTION: ICD-10-CM

## 2022-03-08 DIAGNOSIS — J40 BRONCHITIS: ICD-10-CM

## 2022-03-08 DIAGNOSIS — R05.9 COUGH: Primary | ICD-10-CM

## 2022-03-08 PROCEDURE — 99213 OFFICE O/P EST LOW 20 MIN: CPT | Performed by: NURSE PRACTITIONER

## 2022-03-08 RX ORDER — LEVOFLOXACIN 750 MG/1
750 TABLET ORAL DAILY
Qty: 7 TABLET | Refills: 0 | Status: SHIPPED | OUTPATIENT
Start: 2022-03-08 | End: 2022-06-07 | Stop reason: SDUPTHER

## 2022-03-08 RX ORDER — ALBUTEROL SULFATE 90 UG/1
2 AEROSOL, METERED RESPIRATORY (INHALATION) EVERY 4 HOURS PRN
Qty: 8 G | Refills: 1 | Status: SHIPPED | OUTPATIENT
Start: 2022-03-08

## 2022-03-08 NOTE — PROGRESS NOTES
Subjective     Chief Complaint   Patient presents with   • Cough          • Chest Pain     Patient states it hurts when she coughs.       History of Present Illness  Pt comes in today with complaints of cough. Onset 1 week.Dry cough. Has some cough syrup. No fevers.     Review of Systems   Otherwise complete ROS reviewed.    Past Medical History:   Past Medical History:   Diagnosis Date   • Anxiety    • Heart burn    • Hypertension      Past Surgical History:  Past Surgical History:   Procedure Laterality Date   •  SECTION     • HERNIA REPAIR  2018     Social History:  reports that she has been smoking cigarettes. She started smoking about 22 years ago. She has been smoking about 1.00 pack per day. She has never used smokeless tobacco. She reports that she does not drink alcohol and does not use drugs.    Family History: family history includes Cancer in her mother; Hypertension in her mother.       Allergies:  No Known Allergies  Medications:  Prior to Admission medications    Medication Sig Start Date End Date Taking? Authorizing Provider   albuterol (PROVENTIL) (2.5 MG/3ML) 0.083% nebulizer solution Take 2.5 mg by nebulization Every 4 (Four) Hours As Needed for Wheezing. 21  Yes Luma Hickman APRN   albuterol sulfate  (90 Base) MCG/ACT inhaler Inhale 2 puffs Every 4 (Four) Hours As Needed for Wheezing. 3/26/21  Yes Luma Hickman APRN   benzonatate (Tessalon Perles) 100 MG capsule Take 1 capsule by mouth 3 (Three) Times a Day As Needed for Cough. 3/26/21  Yes Luma Hickman APRN   dicyclomine (Bentyl) 10 MG capsule Take 1 capsule by mouth 2 (Two) Times a Day As Needed (abdominal cramping). 10/14/20  Yes Luma Hickman APRN   doxycycline (MONODOX) 100 MG capsule Take 1 capsule by mouth 2 (Two) Times a Day. 21  Yes Salina Barba DO   Etonogestrel (NEXPLANON) 68 MG implant subdermal implant Nexplanon 68 mg subdermal implant   Inject 1  "implant by subcutaneous route.   Yes Provider, MD Melia   methylPREDNISolone (MEDROL) 4 MG dose pack Take as directed on package instructions. 8/25/21  Yes Salina Barba,    ondansetron ODT (Zofran ODT) 4 MG disintegrating tablet Place 1 tablet on the tongue Every 8 (Eight) Hours As Needed for Nausea or Vomiting. 3/26/21  Yes Luma Hickman APRN       Objective     Vital Signs: /90   Pulse 100   Temp 97 °F (36.1 °C)   Resp 20   Ht 167.6 cm (66\")   Wt 109 kg (240 lb)   SpO2 98%   BMI 38.74 kg/m²   Physical Exam  Vitals reviewed.   Constitutional:       Appearance: She is well-developed.   HENT:      Head: Normocephalic and atraumatic.      Right Ear: Tympanic membrane normal.      Left Ear: Tympanic membrane normal. Tympanic membrane is not retracted.      Ears:      Comments: Dull right TM     Mouth/Throat:      Mouth: Mucous membranes are moist.   Eyes:      Pupils: Pupils are equal, round, and reactive to light.   Neck:      Vascular: No JVD.   Cardiovascular:      Rate and Rhythm: Normal rate and regular rhythm.   Pulmonary:      Effort: Pulmonary effort is normal.      Comments: Coarse right lung base.   Abdominal:      General: Bowel sounds are normal.      Palpations: Abdomen is soft.   Musculoskeletal:         General: No deformity. Normal range of motion.      Cervical back: Normal range of motion and neck supple.   Lymphadenopathy:      Cervical: No cervical adenopathy.   Skin:     General: Skin is warm and dry.   Neurological:      Mental Status: She is alert and oriented to person, place, and time.   Psychiatric:         Behavior: Behavior normal.         Thought Content: Thought content normal.         Judgment: Judgment normal.       Results Reviewed:  Glucose   Date Value Ref Range Status   10/17/2015 81 70 - 100 mg/dL Final     BUN   Date Value Ref Range Status   10/17/2015 11 5 - 21 mg/dL Final     Creatinine   Date Value Ref Range Status   10/17/2015 0.84 0.5 - " 1.4 mg/dL Final     Sodium   Date Value Ref Range Status   10/17/2015 140 135 - 145 mmol/L Final     Potassium   Date Value Ref Range Status   10/17/2015 3.6 3.5 - 5.3 mmol/L Final     Chloride   Date Value Ref Range Status   10/17/2015 102 98 - 110 mmol/L Final     CO2   Date Value Ref Range Status   10/17/2015 27 24 - 31 mmol/L Final     Calcium   Date Value Ref Range Status   10/17/2015 9.7 8.4 - 10.4 mg/dL Final     ALT (SGPT)   Date Value Ref Range Status   10/17/2015 32 0 - 54 Units/L Final     AST (SGOT)   Date Value Ref Range Status   10/17/2015 45 7 - 45 Units/L Final     WBC   Date Value Ref Range Status   10/17/2015 9.59 4.80 - 10.80 K/mcL Final     Hematocrit   Date Value Ref Range Status   10/17/2015 32.8 (L) 37.0 - 47.0 % Final     Platelets   Date Value Ref Range Status   10/17/2015 477 (H) 130 - 400 K/mcL Final         Assessment / Plan     Assessment/Plan:  Diagnoses and all orders for this visit:    1. Cough (Primary)  -     XR Chest PA & Lateral (In Office)  -     albuterol sulfate  (90 Base) MCG/ACT inhaler; Inhale 2 puffs Every 4 (Four) Hours As Needed for Wheezing.  Dispense: 8 g; Refill: 1    2. Dyspnea on exertion  -     XR Chest PA & Lateral (In Office)    3. Bronchitis  -     levoFLOXacin (Levaquin) 750 MG tablet; Take 1 tablet by mouth Daily.  Dispense: 7 tablet; Refill: 0      Return in about 3 months (around 6/8/2022). unless patient needs to be seen sooner or acute issues arise.    Code Status: Full.     I have discussed the patient results/orders and and plan/recommendation with them at today's visit.      Luma Hickman, APRN   03/08/2022

## 2022-05-27 ENCOUNTER — TELEPHONE (OUTPATIENT)
Dept: INTERNAL MEDICINE | Facility: CLINIC | Age: 42
End: 2022-05-27

## 2022-06-07 ENCOUNTER — TELEPHONE (OUTPATIENT)
Dept: INTERNAL MEDICINE | Facility: CLINIC | Age: 42
End: 2022-06-07

## 2022-06-07 ENCOUNTER — OFFICE VISIT (OUTPATIENT)
Dept: INTERNAL MEDICINE | Facility: CLINIC | Age: 42
End: 2022-06-07

## 2022-06-07 VITALS
BODY MASS INDEX: 38.57 KG/M2 | SYSTOLIC BLOOD PRESSURE: 139 MMHG | DIASTOLIC BLOOD PRESSURE: 80 MMHG | RESPIRATION RATE: 16 BRPM | OXYGEN SATURATION: 98 % | HEART RATE: 93 BPM | WEIGHT: 240 LBS | HEIGHT: 66 IN | TEMPERATURE: 98.2 F

## 2022-06-07 DIAGNOSIS — R05.3 POST-COVID CHRONIC COUGH: Primary | ICD-10-CM

## 2022-06-07 DIAGNOSIS — U09.9 POST-COVID CHRONIC COUGH: Primary | ICD-10-CM

## 2022-06-07 PROCEDURE — 99213 OFFICE O/P EST LOW 20 MIN: CPT | Performed by: NURSE PRACTITIONER

## 2022-06-07 RX ORDER — CEFDINIR 300 MG/1
300 CAPSULE ORAL 2 TIMES DAILY
Qty: 20 CAPSULE | Refills: 0 | Status: SHIPPED | OUTPATIENT
Start: 2022-06-07 | End: 2022-06-07 | Stop reason: SDUPTHER

## 2022-06-07 RX ORDER — AMOXICILLIN 500 MG/1
1000 CAPSULE ORAL 2 TIMES DAILY
Qty: 40 CAPSULE | Refills: 0 | Status: SHIPPED | OUTPATIENT
Start: 2022-06-07

## 2022-06-07 RX ORDER — DEXAMETHASONE 2 MG/1
2 TABLET ORAL 2 TIMES DAILY WITH MEALS
Qty: 8 TABLET | Refills: 0 | Status: SHIPPED | OUTPATIENT
Start: 2022-06-07

## 2022-06-07 NOTE — TELEPHONE ENCOUNTER
Caller: Dustin Mosley    Relationship: Self    Best call back number: [phone]    What is the best time to reach you: ANYTIME    Who are you requesting to speak with (clinical staff, provider,  specific staff member): CLINICAL      What was the call regarding: PATIENT WAS SEEN TODAY AND 2 MEDS WERE SENT TO PHARMACY. THE PHARMACY HAD 1 OF THEM BUT DID NOT HAVE THE cefdinir (OMNICEF) 300 MG capsule. COULD SOMETHING ELSE BE SENT IN. THEY TOLD PATIENT THAT THEY DO HAVE Amoxicillin        Do you require a callback: YES      [id number] - [place], [place] - [address] [phone] [facility] [phone]   [phone]

## 2022-06-07 NOTE — PROGRESS NOTES
Answers for HPI/ROS submitted by the patient on 2022  What is the primary reason for your visit?: Cough  Chronicity: new  Onset: in the past 7 days  Progression since onset: waxing and waning  Frequency: hourly  Cough characteristics: productive of sputum  ear congestion: Yes  ear pain: Yes  headaches: Yes  nasal congestion: Yes  postnasal drip: Yes  shortness of breath: Yes  sore throat: Yes  sweats: Yes  wheezing: Yes  Aggravated by: cold air, fumes, lying down  Risk factors for lung disease: smoking/tobacco exposure            Subjective     Chief Complaint   Patient presents with   • Nasal Congestion   • Cough       History of Present Illness  Pt comes in today with complaints of congestion and cough following a diagnosis of COVID on 22. She has laryngitis and still with cough.  Her mucus is now turned green.  He has congestion, ear pain, some shortness of breath and some sweats.  She did state that the significant fatigue is a better from COVID but that the congestion has not improved.    Review of Systems   Otherwise complete ROS reviewed and negative except as mentioned in the HPI.    Past Medical History:   Past Medical History:   Diagnosis Date   • Anxiety    • Heart burn    • Hypertension      Past Surgical History:  Past Surgical History:   Procedure Laterality Date   •  SECTION     • HERNIA REPAIR  2018     Social History:  reports that she has been smoking cigarettes and cigarettes. She started smoking about 22 years ago. She has been smoking about 1.00 pack per day. She has never used smokeless tobacco. She reports that she does not drink alcohol and does not use drugs.    Family History: family history includes Cancer in her mother; Hypertension in her mother.       Allergies:  No Known Allergies  Medications:  Prior to Admission medications    Medication Sig Start Date End Date Taking? Authorizing Provider   albuterol (PROVENTIL) (2.5 MG/3ML) 0.083% nebulizer solution Take 2.5 mg by  "nebulization Every 4 (Four) Hours As Needed for Wheezing. 6/24/21   Luma Hickman APRN   albuterol sulfate  (90 Base) MCG/ACT inhaler Inhale 2 puffs Every 4 (Four) Hours As Needed for Wheezing. 3/8/22   Luma Hickman APRN   benzonatate (Tessalon Perles) 100 MG capsule Take 1 capsule by mouth 3 (Three) Times a Day As Needed for Cough. 3/26/21   Luma Hickman APRN   dicyclomine (Bentyl) 10 MG capsule Take 1 capsule by mouth 2 (Two) Times a Day As Needed (abdominal cramping). 10/14/20   Luma Hickman APRN   doxycycline (MONODOX) 100 MG capsule Take 1 capsule by mouth 2 (Two) Times a Day. 8/25/21   Salina Barba DO   Etonogestrel (NEXPLANON) 68 MG implant subdermal implant Nexplanon 68 mg subdermal implant   Inject 1 implant by subcutaneous route.    Provider, MD Melia   levoFLOXacin (Levaquin) 750 MG tablet Take 1 tablet by mouth Daily. 3/8/22   Luma Hickman APRN   methylPREDNISolone (MEDROL) 4 MG dose pack Take as directed on package instructions. 8/25/21   Salina Barba DO   ondansetron ODT (Zofran ODT) 4 MG disintegrating tablet Place 1 tablet on the tongue Every 8 (Eight) Hours As Needed for Nausea or Vomiting. 3/26/21   Luma Hickman APRN       Objective     Vital Signs: /80   Pulse 93   Temp 98.2 °F (36.8 °C)   Resp 16   Ht 167.6 cm (66\")   Wt 109 kg (240 lb)   SpO2 98%   BMI 38.74 kg/m²   Physical Exam  Vitals reviewed.   Constitutional:       Appearance: She is well-developed. She is ill-appearing.   HENT:      Head: Normocephalic and atraumatic.      Right Ear: Tympanic membrane is bulging.      Left Ear: Tympanic membrane is bulging.   Eyes:      Pupils: Pupils are equal, round, and reactive to light.   Neck:      Vascular: No JVD.   Cardiovascular:      Rate and Rhythm: Normal rate and regular rhythm.   Pulmonary:      Effort: Pulmonary effort is normal.      Comments: Diminished in the right lung " base  Abdominal:      General: Bowel sounds are normal.      Palpations: Abdomen is soft.   Musculoskeletal:         General: No deformity. Normal range of motion.      Cervical back: Normal range of motion and neck supple.   Lymphadenopathy:      Cervical: No cervical adenopathy.   Skin:     General: Skin is warm and dry.   Neurological:      Mental Status: She is alert and oriented to person, place, and time.   Psychiatric:         Behavior: Behavior normal.         Thought Content: Thought content normal.         Judgment: Judgment normal.       Results Reviewed:  Glucose   Date Value Ref Range Status   10/17/2015 81 70 - 100 mg/dL Final     BUN   Date Value Ref Range Status   10/17/2015 11 5 - 21 mg/dL Final     Creatinine   Date Value Ref Range Status   10/17/2015 0.84 0.5 - 1.4 mg/dL Final     Sodium   Date Value Ref Range Status   10/17/2015 140 135 - 145 mmol/L Final     Potassium   Date Value Ref Range Status   10/17/2015 3.6 3.5 - 5.3 mmol/L Final     Chloride   Date Value Ref Range Status   10/17/2015 102 98 - 110 mmol/L Final     CO2   Date Value Ref Range Status   10/17/2015 27 24 - 31 mmol/L Final     Calcium   Date Value Ref Range Status   10/17/2015 9.7 8.4 - 10.4 mg/dL Final     ALT (SGPT)   Date Value Ref Range Status   10/17/2015 32 0 - 54 Units/L Final     AST (SGOT)   Date Value Ref Range Status   10/17/2015 45 7 - 45 Units/L Final     WBC   Date Value Ref Range Status   10/17/2015 9.59 4.80 - 10.80 K/mcL Final     Hematocrit   Date Value Ref Range Status   10/17/2015 32.8 (L) 37.0 - 47.0 % Final     Platelets   Date Value Ref Range Status   10/17/2015 477 (H) 130 - 400 K/mcL Final         Assessment / Plan     Assessment/Plan:  Diagnoses and all orders for this visit:    1. Post-COVID chronic cough (Primary)  -     XR Chest PA & Lateral (In Office)  -     dexamethasone (DECADRON) 2 MG tablet; Take 1 tablet by mouth 2 (Two) Times a Day With Meals.  Dispense: 8 tablet; Refill: 0    Chest x-ray  was unremarkable.  I originally called in Omnicef however the pharmacy did not have that available so I sent in amoxicillin.  She is to let me know if she does not improve.      No follow-ups on file. unless patient needs to be seen sooner or acute issues arise.    I have discussed the patient results/orders and and plan/recommendation with them at today's visit.      Luma Hickman, BRYON   06/07/2022

## 2022-08-11 ENCOUNTER — OFFICE VISIT (OUTPATIENT)
Dept: INTERNAL MEDICINE | Facility: CLINIC | Age: 42
End: 2022-08-11

## 2022-08-11 VITALS
BODY MASS INDEX: 37.86 KG/M2 | RESPIRATION RATE: 16 BRPM | TEMPERATURE: 98.7 F | DIASTOLIC BLOOD PRESSURE: 78 MMHG | HEIGHT: 66 IN | OXYGEN SATURATION: 98 % | HEART RATE: 82 BPM | WEIGHT: 235.6 LBS | SYSTOLIC BLOOD PRESSURE: 132 MMHG

## 2022-08-11 DIAGNOSIS — R10.30 LOWER ABDOMINAL PAIN: Primary | ICD-10-CM

## 2022-08-11 DIAGNOSIS — K59.00 CONSTIPATION, UNSPECIFIED CONSTIPATION TYPE: ICD-10-CM

## 2022-08-11 LAB
BILIRUB BLD-MCNC: NEGATIVE MG/DL
CLARITY, POC: CLEAR
COLOR UR: YELLOW
GLUCOSE UR STRIP-MCNC: NEGATIVE MG/DL
KETONES UR QL: NEGATIVE
LEUKOCYTE EST, POC: NEGATIVE
NITRITE UR-MCNC: NEGATIVE MG/ML
PH UR: 6.5 [PH] (ref 5–8)
PROT UR STRIP-MCNC: NEGATIVE MG/DL
RBC # UR STRIP: ABNORMAL /UL
SP GR UR: 1.02 (ref 1–1.03)
UROBILINOGEN UR QL: NORMAL

## 2022-08-11 PROCEDURE — 99213 OFFICE O/P EST LOW 20 MIN: CPT

## 2022-08-11 PROCEDURE — 81003 URINALYSIS AUTO W/O SCOPE: CPT

## 2022-08-11 NOTE — PROGRESS NOTES
"        Subjective     Chief Complaint   Patient presents with   • Flank Pain   • Pelvic Pain     Patient states that symptoms started Sunday afternoon       History of Present Illness  Patient reports that for the last 5 days she has had bilateral flak pain, lower abdominal discomfort/cramping, and pain sometimes at the end of her urinary streamw Denies any fevers. States that she has had ovarian cysts in the past. Denies any vomiting, has had a little nausea today. Has had some urinary frequency, denies any visible blood in urine. Denies history of kidney stones.   States the pain comes and goes and when it hits her she has to go lay down and it will get better. States she struggles from IBS with chronic constipation and will feel this way when she has not had a bowel movement. States took a laxative 5 days ago and has not really had a bowel movement since taking. Last \"regular bowel movement\" was about a week or so ago.   Patient's PMR from outside medical facility reviewed and noted.    Review of Systems   Constitutional: Negative for activity change, fatigue and unexpected weight change.   HENT: Negative for congestion, mouth sores, postnasal drip and trouble swallowing.    Eyes: Negative for discharge and visual disturbance.   Respiratory: Negative for cough and shortness of breath.    Cardiovascular: Negative for chest pain and leg swelling.   Gastrointestinal: Negative for abdominal pain, constipation, diarrhea and nausea.   Genitourinary: Positive for dysuria and flank pain. Negative for decreased urine volume, difficulty urinating and hematuria.   Musculoskeletal: Positive for back pain. Negative for gait problem.   Skin: Negative for color change and rash.   Allergic/Immunologic: Negative for environmental allergies and immunocompromised state.   Neurological: Negative for weakness and headaches.   Psychiatric/Behavioral: Negative for confusion and sleep disturbance.        Otherwise complete ROS reviewed " and negative except as mentioned in the HPI.    Past Medical History:   Past Medical History:   Diagnosis Date   • Anxiety    • Heart burn    • Hypertension      Past Surgical History:  Past Surgical History:   Procedure Laterality Date   •  SECTION     • HERNIA REPAIR  2018     Social History:  reports that she has been smoking cigarettes and cigarettes. She started smoking about 22 years ago. She has been smoking about 1.00 pack per day. She has never used smokeless tobacco. She reports that she does not drink alcohol and does not use drugs.    Family History: family history includes Cancer in her mother; Hypertension in her mother.      Allergies:  No Known Allergies  Medications:  Prior to Admission medications    Medication Sig Start Date End Date Taking? Authorizing Provider   Etonogestrel (NEXPLANON) 68 MG implant subdermal implant Nexplanon 68 mg subdermal implant   Inject 1 implant by subcutaneous route.   Yes Provider, MD Melia   albuterol (PROVENTIL) (2.5 MG/3ML) 0.083% nebulizer solution Take 2.5 mg by nebulization Every 4 (Four) Hours As Needed for Wheezing. 21   Luma Hickman APRN   albuterol sulfate  (90 Base) MCG/ACT inhaler Inhale 2 puffs Every 4 (Four) Hours As Needed for Wheezing. 3/8/22   Luma Hickman APRN   amoxicillin (AMOXIL) 500 MG capsule Take 2 capsules by mouth 2 (Two) Times a Day. 22   Luma Hickman APRN   benzonatate (Tessalon Perles) 100 MG capsule Take 1 capsule by mouth 3 (Three) Times a Day As Needed for Cough. 3/26/21   Luma Hickman APRN   dexamethasone (DECADRON) 2 MG tablet Take 1 tablet by mouth 2 (Two) Times a Day With Meals. 22   Luma Hickman APRN   dicyclomine (Bentyl) 10 MG capsule Take 1 capsule by mouth 2 (Two) Times a Day As Needed (abdominal cramping). 10/14/20   Luma Hickman APRN   doxycycline (MONODOX) 100 MG capsule Take 1 capsule by mouth 2 (Two) Times a Day.  "8/25/21   Salina Barba, DO   ondansetron ODT (Zofran ODT) 4 MG disintegrating tablet Place 1 tablet on the tongue Every 8 (Eight) Hours As Needed for Nausea or Vomiting. 3/26/21   Luma Hickman APRN       JOANN:        PHQ-9 Depression Screening  Little interest or pleasure in doing things? 0-->not at all   Feeling down, depressed, or hopeless? 0-->not at all   Trouble falling or staying asleep, or sleeping too much?     Feeling tired or having little energy?     Poor appetite or overeating?     Feeling bad about yourself - or that you are a failure or have let yourself or your family down?     Trouble concentrating on things, such as reading the newspaper or watching television?     Moving or speaking so slowly that other people could have noticed? Or the opposite - being so fidgety or restless that you have been moving around a lot more than usual?     Thoughts that you would be better off dead, or of hurting yourself in some way?     PHQ-9 Total Score 0   If you checked off any problems, how difficult have these problems made it for you to do your work, take care of things at home, or get along with other people?         PHQ-9 Total Score: 0       Objective     Vital Signs: /78 (BP Location: Left arm, Patient Position: Sitting, Cuff Size: Adult)   Pulse 82   Temp 98.7 °F (37.1 °C) (Temporal)   Resp 16   Ht 167.6 cm (66\")   Wt 107 kg (235 lb 9.6 oz)   LMP 07/29/2022   SpO2 98%   Breastfeeding No   BMI 38.03 kg/m²   Physical Exam  Constitutional:       General: She is not in acute distress.     Appearance: Normal appearance. She is not ill-appearing.   HENT:      Head: Normocephalic and atraumatic.      Right Ear: External ear normal.      Left Ear: External ear normal.      Nose: Nose normal.      Mouth/Throat:      Mouth: Mucous membranes are moist.      Pharynx: No posterior oropharyngeal erythema.   Eyes:      General: No scleral icterus.     Extraocular Movements: Extraocular " movements intact.      Conjunctiva/sclera: Conjunctivae normal.      Pupils: Pupils are equal, round, and reactive to light.   Cardiovascular:      Rate and Rhythm: Normal rate and regular rhythm.      Pulses: Normal pulses.      Heart sounds: Normal heart sounds.   Pulmonary:      Effort: Pulmonary effort is normal. No respiratory distress.      Breath sounds: Normal breath sounds. No wheezing.   Abdominal:      General: Abdomen is flat. Bowel sounds are normal. There is no distension.      Palpations: Abdomen is soft.      Tenderness: There is abdominal tenderness.   Musculoskeletal:         General: Normal range of motion.      Cervical back: Normal range of motion.      Right lower leg: No edema.      Left lower leg: No edema.   Skin:     General: Skin is warm and dry.      Capillary Refill: Capillary refill takes less than 2 seconds.      Findings: No erythema or rash.   Neurological:      General: No focal deficit present.      Mental Status: She is alert and oriented to person, place, and time. Mental status is at baseline.      Motor: No weakness.   Psychiatric:         Mood and Affect: Mood normal.         Behavior: Behavior normal.         Thought Content: Thought content normal.         Judgment: Judgment normal.         Results Reviewed:  Glucose   Date Value Ref Range Status   10/17/2015 81 70 - 100 mg/dL Final     BUN   Date Value Ref Range Status   10/17/2015 11 5 - 21 mg/dL Final     Creatinine   Date Value Ref Range Status   10/17/2015 0.84 0.5 - 1.4 mg/dL Final     Sodium   Date Value Ref Range Status   10/17/2015 140 135 - 145 mmol/L Final     Potassium   Date Value Ref Range Status   10/17/2015 3.6 3.5 - 5.3 mmol/L Final     Chloride   Date Value Ref Range Status   10/17/2015 102 98 - 110 mmol/L Final     CO2   Date Value Ref Range Status   10/17/2015 27 24 - 31 mmol/L Final     Calcium   Date Value Ref Range Status   10/17/2015 9.7 8.4 - 10.4 mg/dL Final     ALT (SGPT)   Date Value Ref Range  Status   10/17/2015 32 0 - 54 Units/L Final     AST (SGOT)   Date Value Ref Range Status   10/17/2015 45 7 - 45 Units/L Final     WBC   Date Value Ref Range Status   10/17/2015 9.59 4.80 - 10.80 K/mcL Final     Hematocrit   Date Value Ref Range Status   10/17/2015 32.8 (L) 37.0 - 47.0 % Final     Platelets   Date Value Ref Range Status   10/17/2015 477 (H) 130 - 400 K/mcL Final         Assessment / Plan     Assessment/Plan:  1. Lower abdominal pain  - XR Abdomen KUB (In Office)  - POCT urinalysis dipstick, multipro    2. Constipation, unspecified constipation type  - magnesium citrate solution; Take 296 mL by mouth 1 (One) Time for 1 dose.  Dispense: 296 mL; Refill: 0    Discussed KUB indicated a significant amount of stool in abdomen. Advised majority of stool was noted on right side and patient reports that is where the majority of the discomfort it. Discussed that patient should attempted to evacuate stool and if symptoms persist then will return for pelvis US.     Return if symptoms worsen or fail to improve. unless patient needs to be seen sooner or acute issues arise.      I have discussed the patient results/orders and and plan/recommendation with them at today's visit.      Hina Gonzalez, APRN   08/11/2022

## 2024-09-10 ENCOUNTER — OFFICE VISIT (OUTPATIENT)
Dept: INTERNAL MEDICINE | Facility: CLINIC | Age: 44
End: 2024-09-10
Payer: COMMERCIAL

## 2024-09-10 VITALS
HEIGHT: 66 IN | HEART RATE: 86 BPM | SYSTOLIC BLOOD PRESSURE: 133 MMHG | TEMPERATURE: 98.7 F | OXYGEN SATURATION: 98 % | DIASTOLIC BLOOD PRESSURE: 81 MMHG | WEIGHT: 223 LBS | BODY MASS INDEX: 35.84 KG/M2

## 2024-09-10 DIAGNOSIS — R30.0 DYSURIA: Primary | ICD-10-CM

## 2024-09-10 DIAGNOSIS — R10.32 LLQ PAIN: ICD-10-CM

## 2024-09-10 PROBLEM — K21.9 GASTROESOPHAGEAL REFLUX DISEASE WITHOUT ESOPHAGITIS: Status: ACTIVE | Noted: 2024-09-10

## 2024-09-10 PROBLEM — I10 BENIGN ESSENTIAL HTN: Status: ACTIVE | Noted: 2024-09-10

## 2024-09-10 PROBLEM — G56.00 CARPAL TUNNEL SYNDROME: Status: ACTIVE | Noted: 2024-09-10

## 2024-09-10 LAB
BILIRUB BLD-MCNC: NEGATIVE MG/DL
CLARITY, POC: CLEAR
COLOR UR: YELLOW
GLUCOSE UR STRIP-MCNC: NEGATIVE MG/DL
KETONES UR QL: NEGATIVE
LEUKOCYTE EST, POC: NEGATIVE
NITRITE UR-MCNC: NEGATIVE MG/ML
PH UR: 7 [PH] (ref 5–8)
PROT UR STRIP-MCNC: NEGATIVE MG/DL
RBC # UR STRIP: ABNORMAL /UL
SP GR UR: 1.01 (ref 1–1.03)
UROBILINOGEN UR QL: ABNORMAL

## 2024-09-10 PROCEDURE — 1160F RVW MEDS BY RX/DR IN RCRD: CPT | Performed by: FAMILY MEDICINE

## 2024-09-10 PROCEDURE — 1159F MED LIST DOCD IN RCRD: CPT | Performed by: FAMILY MEDICINE

## 2024-09-10 PROCEDURE — 3075F SYST BP GE 130 - 139MM HG: CPT | Performed by: FAMILY MEDICINE

## 2024-09-10 PROCEDURE — 99213 OFFICE O/P EST LOW 20 MIN: CPT | Performed by: FAMILY MEDICINE

## 2024-09-10 PROCEDURE — 3079F DIAST BP 80-89 MM HG: CPT | Performed by: FAMILY MEDICINE

## 2024-09-10 PROCEDURE — 1125F AMNT PAIN NOTED PAIN PRSNT: CPT | Performed by: FAMILY MEDICINE

## 2024-09-10 RX ORDER — LEVOFLOXACIN 500 MG/1
500 TABLET, FILM COATED ORAL DAILY
Qty: 7 TABLET | Refills: 0 | Status: SHIPPED | OUTPATIENT
Start: 2024-09-10 | End: 2024-09-17

## 2024-09-10 RX ORDER — PHENAZOPYRIDINE HYDROCHLORIDE 200 MG/1
200 TABLET, FILM COATED ORAL 3 TIMES DAILY PRN
Qty: 6 TABLET | Refills: 0 | Status: SHIPPED | OUTPATIENT
Start: 2024-09-10 | End: 2024-09-12

## 2024-09-10 NOTE — PROGRESS NOTES
Subjective     Chief Complaint   Patient presents with    Abdominal Pain     Left side  goes around to back         History of Present Illness    Patient's PMR from outside medical facility reviewed and noted.    Dustin Mosley is a 43 y.o. female who presents for a sick visit today..  Patient comes in secondary to left-sided flank pain as well as left-sided abdominal pain.  Patient has noticed both some pain on urination as well as diarrhea.  Patient has had this in the past and states she has done well with antibiotics urinalysis which were performed which showed hematic area based on the patient's symptoms I believe it is a possibility she may have both diverticular disease and kidney stones or possible cystitis we will go ahead and start her on some antibiotics that would treat both conditions as well as some Pyridium for burning on urination.  Patient advised that should it is getting any worse would like to get a CT for further evaluation.    I discussed at length preventative and cancer screening with her and its role in reducing types of cancer and other health problems.  Patient understands the risks and benefits of screening and has decided that at this time she does not wish to do any preventative screening.  Patient declines colon cancer screening, breast and cervical cancer screening, abdominal aortic aneurysm screening, Bone density screening and other preventative screening protocols.  I advised her that I will bring this up in the future to see if she has changed their mind on preventative screening and wellness checks     Past Medical History:   Past Medical History:   Diagnosis Date    Anxiety     Heart burn     Hypertension      Past Surgical History:  Past Surgical History:   Procedure Laterality Date     SECTION      HERNIA REPAIR       Social History:  reports that she has been smoking cigarettes. She started smoking about 24 years ago. She has a 24.7 pack-year smoking  history. She has never used smokeless tobacco. She reports that she does not drink alcohol and does not use drugs.    Family History: family history includes Cancer in her mother; Hypertension in her mother.      Allergies:  No Known Allergies  Medications:  Prior to Admission medications    Medication Sig Start Date End Date Taking? Authorizing Provider   albuterol (PROVENTIL) (2.5 MG/3ML) 0.083% nebulizer solution Take 2.5 mg by nebulization Every 4 (Four) Hours As Needed for Wheezing. 6/24/21  Yes Luma Hickman APRN   albuterol sulfate  (90 Base) MCG/ACT inhaler Inhale 2 puffs Every 4 (Four) Hours As Needed for Wheezing. 3/8/22  Yes Luma Hickman APRN   amoxicillin (AMOXIL) 500 MG capsule Take 2 capsules by mouth 2 (Two) Times a Day.  Patient not taking: Reported on 9/10/2024 6/7/22 9/10/24  Luma Hicmkan APRN   benzonatate (Tessalon Perles) 100 MG capsule Take 1 capsule by mouth 3 (Three) Times a Day As Needed for Cough.  Patient not taking: Reported on 9/10/2024 3/26/21 9/10/24  Luma Hickman APRN   dexamethasone (DECADRON) 2 MG tablet Take 1 tablet by mouth 2 (Two) Times a Day With Meals.  Patient not taking: Reported on 9/10/2024 6/7/22 9/10/24  Luma Hickman APRN   dicyclomine (Bentyl) 10 MG capsule Take 1 capsule by mouth 2 (Two) Times a Day As Needed (abdominal cramping).  Patient not taking: Reported on 9/10/2024 10/14/20 9/10/24  Luma Hickman APRN   Etonogestrel (NEXPLANON) 68 MG implant subdermal implant Nexplanon 68 mg subdermal implant   Inject 1 implant by subcutaneous route.  Patient not taking: To be inserted one time by prescriber. Route Subdermal.  Reported on 9/10/2024  9/10/24  Provider, MD Melia   ondansetron ODT (Zofran ODT) 4 MG disintegrating tablet Place 1 tablet on the tongue Every 8 (Eight) Hours As Needed for Nausea or Vomiting.  Patient not taking: Reported on 9/10/2024 3/26/21 9/10/24  Luma Hickman  BRYON Quinteros       JOANN: Over the last two weeks, how often have you been bothered by the following problems?  Feeling nervous, anxious or on edge: Not at all  Not being able to stop or control worrying: Not at all  Worrying too much about different things: Not at all  Trouble Relaxing: Not at all  Being so restless that it is hard to sit still: Not at all  Becoming easily annoyed or irritable: Not at all  Feeling afraid as if something awful might happen: Not at all  JOANN 7 Total Score: 0  If you checked any problems, how difficult have these problems made it for you to do your work, take care of things at home, or get along with other people: Not difficult at all      PHQ-9 Depression Screening  Little interest or pleasure in doing things? 0-->not at all   Feeling down, depressed, or hopeless? 0-->not at all   Trouble falling or staying asleep, or sleeping too much?     Feeling tired or having little energy?     Poor appetite or overeating?     Feeling bad about yourself - or that you are a failure or have let yourself or your family down?     Trouble concentrating on things, such as reading the newspaper or watching television?     Moving or speaking so slowly that other people could have noticed? Or the opposite - being so fidgety or restless that you have been moving around a lot more than usual?     Thoughts that you would be better off dead, or of hurting yourself in some way?     PHQ-9 Total Score 0   If you checked off any problems, how difficult have these problems made it for you to do your work, take care of things at home, or get along with other people?         PHQ-9 Total Score: 0   0 (Negative screening for depression)  Support given, observe for worsening symptoms    Review of systems   negative unless otherwise specified above in HPI    Objective     Vital Signs: /81 (BP Location: Left arm, Patient Position: Sitting, Cuff Size: Adult)   Pulse 86   Temp 98.7 °F (37.1 °C) (Infrared)   Ht 167.6  "cm (66\")   Wt 101 kg (223 lb)   SpO2 98%   BMI 35.99 kg/m²     Physical Exam  Vitals and nursing note reviewed.   Constitutional:       General: She is not in acute distress.     Appearance: Normal appearance.   HENT:      Head: Normocephalic.   Eyes:      Extraocular Movements: Extraocular movements intact.      Pupils: Pupils are equal, round, and reactive to light.   Cardiovascular:      Rate and Rhythm: Normal rate and regular rhythm.      Heart sounds: Normal heart sounds. No murmur heard.  Pulmonary:      Effort: Pulmonary effort is normal. No respiratory distress.      Breath sounds: Normal breath sounds. No rhonchi or rales.   Abdominal:      General: Abdomen is flat. Bowel sounds are normal.      Palpations: Abdomen is soft.   Neurological:      General: No focal deficit present.      Mental Status: She is alert.                Results Reviewed:  Glucose   Date Value Ref Range Status   10/17/2015 81 70 - 100 mg/dL Final     BUN   Date Value Ref Range Status   10/17/2015 11 5 - 21 mg/dL Final     Creatinine   Date Value Ref Range Status   10/17/2015 0.84 0.5 - 1.4 mg/dL Final     Sodium   Date Value Ref Range Status   10/17/2015 140 135 - 145 mmol/L Final     Potassium   Date Value Ref Range Status   10/17/2015 3.6 3.5 - 5.3 mmol/L Final     Chloride   Date Value Ref Range Status   10/17/2015 102 98 - 110 mmol/L Final     CO2   Date Value Ref Range Status   10/17/2015 27 24 - 31 mmol/L Final     Calcium   Date Value Ref Range Status   10/17/2015 9.7 8.4 - 10.4 mg/dL Final     ALT (SGPT)   Date Value Ref Range Status   10/17/2015 32 0 - 54 Units/L Final     AST (SGOT)   Date Value Ref Range Status   10/17/2015 45 7 - 45 Units/L Final     WBC   Date Value Ref Range Status   10/17/2015 9.59 4.80 - 10.80 K/mcL Final     Hematocrit   Date Value Ref Range Status   10/17/2015 32.8 (L) 37.0 - 47.0 % Final     Platelets   Date Value Ref Range Status   10/17/2015 477 (H) 130 - 400 K/mcL Final             Procedure "   Procedures       Assessment / Plan     Assessment/Plan:   Diagnosis Plan   1. Dysuria  POC Urinalysis Dipstick, Multipro    levoFLOXacin (Levaquin) 500 MG tablet    phenazopyridine (Pyridium) 200 MG tablet      2. LLQ pain  levoFLOXacin (Levaquin) 500 MG tablet            No follow-ups on file. unless patient needs to be seen sooner or acute issues arise.      I have discussed the patient results/orders and and plan/recommendation with them at today's visit.      Signed by:    Hugo Espinoza MD Date: 09/10/24